# Patient Record
Sex: FEMALE | Race: WHITE | NOT HISPANIC OR LATINO
[De-identification: names, ages, dates, MRNs, and addresses within clinical notes are randomized per-mention and may not be internally consistent; named-entity substitution may affect disease eponyms.]

---

## 2020-12-30 PROBLEM — Z00.00 ENCOUNTER FOR PREVENTIVE HEALTH EXAMINATION: Status: ACTIVE | Noted: 2020-12-30

## 2021-02-19 ENCOUNTER — APPOINTMENT (OUTPATIENT)
Dept: COLORECTAL SURGERY | Facility: CLINIC | Age: 56
End: 2021-02-19
Payer: MEDICAID

## 2021-02-19 VITALS
HEIGHT: 66 IN | SYSTOLIC BLOOD PRESSURE: 116 MMHG | TEMPERATURE: 98 F | BODY MASS INDEX: 25.55 KG/M2 | HEART RATE: 81 BPM | DIASTOLIC BLOOD PRESSURE: 74 MMHG | WEIGHT: 159 LBS

## 2021-02-19 DIAGNOSIS — Z80.0 FAMILY HISTORY OF MALIGNANT NEOPLASM OF DIGESTIVE ORGANS: ICD-10-CM

## 2021-02-19 DIAGNOSIS — Z82.49 FAMILY HISTORY OF ISCHEMIC HEART DISEASE AND OTHER DISEASES OF THE CIRCULATORY SYSTEM: ICD-10-CM

## 2021-02-19 DIAGNOSIS — R01.1 CARDIAC MURMUR, UNSPECIFIED: ICD-10-CM

## 2021-02-19 DIAGNOSIS — Z80.6 FAMILY HISTORY OF LEUKEMIA: ICD-10-CM

## 2021-02-19 DIAGNOSIS — Z82.3 FAMILY HISTORY OF STROKE: ICD-10-CM

## 2021-02-19 DIAGNOSIS — Z12.11 ENCOUNTER FOR SCREENING FOR MALIGNANT NEOPLASM OF COLON: ICD-10-CM

## 2021-02-19 PROCEDURE — 99072 ADDL SUPL MATRL&STAF TM PHE: CPT

## 2021-02-19 PROCEDURE — 99203 OFFICE O/P NEW LOW 30 MIN: CPT

## 2021-02-19 NOTE — HISTORY OF PRESENT ILLNESS
[FreeTextEntry1] : 56 y/o F presents for colonoscopy consultation\par Previously seen at [facility] and underwent screening colonoscopy 4/18/18\par Colonoscopy normal, no specimens collected with recommended 10 year repeat\par \par Denies abdominal or rectal pain, rectal bleeding\par Denies N/V, change in appetite or weight\par BH: Daily\par Denies constipation, diarrhea or straining\par Reports adequate dietary fiber and water intake\par \par FMH of colon cancer diagnosed in his 70's. Brother has cancer as well but is uncertain of type, diagnosed in his 60's \par

## 2021-04-21 ENCOUNTER — APPOINTMENT (OUTPATIENT)
Dept: COLORECTAL SURGERY | Facility: CLINIC | Age: 56
End: 2021-04-21
Payer: MEDICAID

## 2021-04-21 PROCEDURE — 99072 ADDL SUPL MATRL&STAF TM PHE: CPT

## 2021-04-21 PROCEDURE — 45380 COLONOSCOPY AND BIOPSY: CPT

## 2021-04-28 ENCOUNTER — NON-APPOINTMENT (OUTPATIENT)
Age: 56
End: 2021-04-28

## 2024-02-05 ENCOUNTER — NON-APPOINTMENT (OUTPATIENT)
Age: 59
End: 2024-02-05

## 2024-02-05 ENCOUNTER — APPOINTMENT (OUTPATIENT)
Dept: COLORECTAL SURGERY | Facility: CLINIC | Age: 59
End: 2024-02-05
Payer: MEDICAID

## 2024-02-05 VITALS
HEIGHT: 66 IN | TEMPERATURE: 98 F | DIASTOLIC BLOOD PRESSURE: 86 MMHG | HEART RATE: 99 BPM | WEIGHT: 170 LBS | SYSTOLIC BLOOD PRESSURE: 150 MMHG | BODY MASS INDEX: 27.32 KG/M2

## 2024-02-05 DIAGNOSIS — Z12.11 ENCOUNTER FOR SCREENING FOR MALIGNANT NEOPLASM OF COLON: ICD-10-CM

## 2024-02-05 PROCEDURE — 99213 OFFICE O/P EST LOW 20 MIN: CPT

## 2024-02-23 PROBLEM — Z12.11 COLON CANCER SCREENING: Status: ACTIVE | Noted: 2024-02-05

## 2024-02-23 NOTE — HISTORY OF PRESENT ILLNESS
[FreeTextEntry1] : 59 yo F presents for colonoscopy consultation  Father diagnosed w/ colon CA age 75, brother w/ pancreatic CA, brother w/ liver CA, brother w/ CA (not sure if lung or colon) Previously seen at [facility] and underwent screening colonoscopy 4/18/18 Colonoscopy normal, no specimens collected with recommended 10 year repeat  Last scope w/ Dr. Smart 4/21/21, prep excellent.  Exam notable for 3 mm semi sessile polyp in sigmoid colon, s/p resection 3 mm semi sessile polyp in transverse colon, s/p resection Pathology: Transverse colon: TA Sigmoid colon: Sessile serrated polyp/adenoma  PEDRO Matson contacted pt to review pathology findings and recommended repeat colonoscopy in 3-5 years  Pt presents to discuss colonoscopy scheduling Denies updates to medical history denies unintentional weight loss, abd pain, n/v/c/d or rectal bleeding

## 2024-02-23 NOTE — PHYSICAL EXAM
[Abdomen Masses] : No abdominal masses [Abdomen Tenderness] : ~T No ~M abdominal tenderness [No HSM] : no hepatosplenomegaly [JVD] : no jugular venous distention  [Normal Breath Sounds] : Normal breath sounds [Normal Heart Sounds] : normal heart sounds [No Rash or Lesion] : No rash or lesion

## 2024-02-23 NOTE — ASSESSMENT
[FreeTextEntry1] : I have reviewed with the patient in detail the role of colonoscopy any evaluation of possible colon polyps and cancer. The risks, alternatives and benefits of the procedure were detailed and client including but not limited to risk of bleeding, risk of infection, risk of perforation and requiring further and potential surgery, and other secondary complications of the procedure. The colonoscopy will be performed to evaluate for possible polyps and cancer and if possible a polypectomy or removal of the polyp will be performed. Bowel preparation instructions were reviewed. The need for long-term surveillance based on findings of the colonoscopy were discussed. The patient consents to the planned procedure.

## 2024-04-17 ENCOUNTER — APPOINTMENT (OUTPATIENT)
Dept: COLORECTAL SURGERY | Facility: CLINIC | Age: 59
End: 2024-04-17
Payer: MEDICAID

## 2024-04-17 PROCEDURE — 45380 COLONOSCOPY AND BIOPSY: CPT

## 2024-05-15 ENCOUNTER — NON-APPOINTMENT (OUTPATIENT)
Age: 59
End: 2024-05-15